# Patient Record
Sex: FEMALE | Race: WHITE | Employment: OTHER | URBAN - METROPOLITAN AREA
[De-identification: names, ages, dates, MRNs, and addresses within clinical notes are randomized per-mention and may not be internally consistent; named-entity substitution may affect disease eponyms.]

---

## 2023-11-30 ENCOUNTER — OFFICE VISIT (OUTPATIENT)
Facility: LOCATION | Age: 83
End: 2023-11-30
Payer: MEDICARE

## 2023-11-30 DIAGNOSIS — H90.5 SENSORINEURAL HEARING LOSS (SNHL) OF RIGHT EAR, UNSPECIFIED HEARING STATUS ON CONTRALATERAL SIDE: Primary | ICD-10-CM

## 2023-11-30 DIAGNOSIS — H90.3 SENSORINEURAL HEARING LOSS, ASYMMETRICAL: Primary | ICD-10-CM

## 2023-11-30 DIAGNOSIS — H91.93 AUDITORY IMPAIRMENT, BILATERAL: ICD-10-CM

## 2023-11-30 DIAGNOSIS — H90.3 SENSORY HEARING LOSS, BILATERAL: ICD-10-CM

## 2023-11-30 PROCEDURE — 99204 OFFICE O/P NEW MOD 45 MIN: CPT | Performed by: OTOLARYNGOLOGY

## 2023-11-30 PROCEDURE — 92557 COMPREHENSIVE HEARING TEST: CPT | Performed by: AUDIOLOGIST

## 2023-11-30 PROCEDURE — 92567 TYMPANOMETRY: CPT | Performed by: AUDIOLOGIST

## 2023-11-30 NOTE — PROGRESS NOTES
Kae Cho was seen for an audiometric evaluation and tympanogram today. Referred back to physician.     Beka July, AuD

## 2023-11-30 NOTE — PROGRESS NOTES
[facility], [facility] [address] Melvin Aburto    Report of Consultation    Date of Consult: 11/30/2023     Reason for Consultation:   Decreased hearing. History of Present Illness:   Patient is a 82year old female who is being seen for a recent change to her hearing of the right ear. Patient has worn a hearing aid on the left side but feels that the right side has decreased over the last several months. She denies fevers chills nausea or vomiting. 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 Past Medical History  Past Medical History:   Diagnosis Date    Cancer of head, face, and neck     s/p resection of part of tongue, tonsil, neck    Chest pain 7/2009    NEG STRESS TEST       Past Surgical History  Past Surgical History:   Procedure Laterality Date    CHOLECYSTECTOMY      CORNEAL TRNSPL, ENDOTHELIAL      left eye    OTHER SURGICAL HISTORY      s/p cancer resection       Family History  Family History   Problem Relation Age of Onset    Heart Disorder Mother         CHF    Diabetes Mother        Social History  Pediatric History   Patient Parents    Not on file     Other Topics Concern    Not on file   Social History Narrative    Not on file           Current Medications:  Current Outpatient Medications   Medication Sig Dispense Refill    Escitalopram Oxalate (LEXAPRO) 10 MG Oral Tab 1 TABLET DAILY 30 Tab 0    FISH OIL 1000 MG OR CAPS daily      ALEVE 220 MG OR CAPS 1 CAPSULE EVERY 12 HOURS AS NEEDED      ADVIL 200 MG OR TABS 1 TABLET EVERY 4 TO 6 HOURS AS NEEDED         Allergies  Allergies   Allergen Reactions    Penicillins NAUSEA ONLY       Review of Systems:   A comprehensive review of systems was negative. Physical Exam:   There were no vitals taken for this visit. Constitutional Normal Overall appearance - Normal.   Psychiatric Normal Orientation - Oriented to time, place, person & situation. Appropriate mood and affect. Head/Face Normal Facial features -- Normal. Skull - Normal.   Eyes Normal Pupils equal ,round ,react to light and accomidate   Ears Normal External Ear Right: Normal, Left: Normal. Canal - Right: Normal, Left: Normal. TM - Right: Normal, Left: Normal.   Nose Normal External Nose, Normal, Septum -Midline, Right, Left Turbinates - Right: Normal, Hypertrophic Left: Normal, Hypertrophic   Mouth/Throat Normal Lips/teeth/gums - Normal. Tonsils - Normal. Oropharynx - Normal.   Neck Exam Normal Inspection - Normal. Palpation - Normal. Parotid gland - Normal. Thyroid gland - Normal.   Neurological Normal Memory - Normal. Cranial nerves - Cranial nerves II through XII grossly intact. Nasopharynx Normal  Normal        Skin Normal Inspection - Normal.        Lymph Detail Normal Submental. Submandibular. Anterior cervical. Posterior cervical. Supraclavicular. Audiogram shows bilateral sensorineural hearing loss with a asymmetry in the right ear across all frequencies. Results:     Laboratory Data:  Lab Results   Component Value Date    WBC 4.9 07/22/2009     07/22/2009    CREATSERUM 0.80 02/15/2010    BUN 16 02/15/2010     07/22/2009    K 3.8 07/22/2009     07/22/2009    CO2 25 07/22/2009    ALB 4.3 07/22/2009    ALKPHO 78 07/22/2009    TP 6.9 07/22/2009    AST 18 07/22/2009    ALT 21 07/22/2009    TSH 3.720 02/15/2010         Imaging:  No results found. Impression:   Patient has asymmetric hearing in the right ear with also a sensorineural hearing loss which would be amenable to amplification. Recommendations:  I have cleared her for hearing aid use for each ear. She will see audiology regarding hearing aids. Due to the asymmetry I recommend she have MRI of IACs to better delineate the anatomy. She had a normal MRI approximately a year ago just in the brain. Patient and her friend understand her treatment plan.       Thank you for allowing me to participate in the care of your patient.       Thomas Polanco MD  11/30/2023

## 2024-02-13 ENCOUNTER — HOSPITAL ENCOUNTER (OUTPATIENT)
Dept: MRI IMAGING | Age: 84
Discharge: HOME | End: 2024-02-13
Attending: OTOLARYNGOLOGY
Payer: MEDICARE

## 2024-02-13 DIAGNOSIS — H90.5 SENSORINEURAL HEARING LOSS (SNHL) OF RIGHT EAR, UNSPECIFIED HEARING STATUS ON CONTRALATERAL SIDE: ICD-10-CM

## 2024-02-13 PROCEDURE — 70551 MRI BRAIN STEM W/O DYE: CPT | Performed by: OTOLARYNGOLOGY

## 2024-10-30 ENCOUNTER — OFFICE VISIT (OUTPATIENT)
Dept: INTERNAL MEDICINE CLINIC | Facility: CLINIC | Age: 84
End: 2024-10-30

## 2024-10-30 VITALS
WEIGHT: 148 LBS | DIASTOLIC BLOOD PRESSURE: 58 MMHG | TEMPERATURE: 98 F | HEART RATE: 57 BPM | OXYGEN SATURATION: 96 % | BODY MASS INDEX: 27.23 KG/M2 | HEIGHT: 62 IN | SYSTOLIC BLOOD PRESSURE: 96 MMHG

## 2024-10-30 DIAGNOSIS — G56.03 CARPAL TUNNEL SYNDROME ON BOTH SIDES: ICD-10-CM

## 2024-10-30 DIAGNOSIS — M25.561 ACUTE PAIN OF RIGHT KNEE: ICD-10-CM

## 2024-10-30 DIAGNOSIS — E03.8 OTHER SPECIFIED HYPOTHYROIDISM: Primary | ICD-10-CM

## 2024-10-30 DIAGNOSIS — K13.79 MOUTH SORE: ICD-10-CM

## 2024-10-30 PROBLEM — G60.9 IDIOPATHIC PERIPHERAL NEUROPATHY: Status: ACTIVE | Noted: 2021-06-21

## 2024-10-30 PROBLEM — H90.3 SENSORINEURAL HEARING LOSS (SNHL) OF BOTH EARS: Status: ACTIVE | Noted: 2024-03-15

## 2024-10-30 PROBLEM — M25.569 CHRONIC KNEE PAIN: Status: ACTIVE | Noted: 2024-04-12

## 2024-10-30 PROBLEM — H81.10 BPPV (BENIGN PAROXYSMAL POSITIONAL VERTIGO), UNSPECIFIED LATERALITY: Status: ACTIVE | Noted: 2022-02-15

## 2024-10-30 PROBLEM — I62.9 INTRACRANIAL HEMORRHAGE (HCC): Status: ACTIVE | Noted: 2022-07-29

## 2024-10-30 PROBLEM — M54.9 CHRONIC NECK AND BACK PAIN: Status: ACTIVE | Noted: 2024-04-12

## 2024-10-30 PROBLEM — R47.9 SPEECH IMPEDIMENT: Status: ACTIVE | Noted: 2024-03-15

## 2024-10-30 PROBLEM — R29.6 RECURRENT FALLS: Status: ACTIVE | Noted: 2022-08-01

## 2024-10-30 PROBLEM — M47.817 LUMBOSACRAL SPONDYLOSIS WITHOUT MYELOPATHY: Status: ACTIVE | Noted: 2024-10-30

## 2024-10-30 PROBLEM — M54.2 CHRONIC NECK AND BACK PAIN: Status: ACTIVE | Noted: 2024-04-12

## 2024-10-30 PROBLEM — M17.0 PRIMARY OSTEOARTHRITIS OF BOTH KNEES: Status: ACTIVE | Noted: 2022-05-18

## 2024-10-30 PROBLEM — M96.1 CERVICAL POST-LAMINECTOMY SYNDROME: Status: ACTIVE | Noted: 2019-11-01

## 2024-10-30 PROBLEM — G89.29 CHRONIC NECK AND BACK PAIN: Status: ACTIVE | Noted: 2024-04-12

## 2024-10-30 PROBLEM — G89.29 CHRONIC KNEE PAIN: Status: ACTIVE | Noted: 2024-04-12

## 2024-10-30 PROBLEM — E78.2 MIXED HYPERLIPIDEMIA: Status: ACTIVE | Noted: 2022-11-21

## 2024-10-30 PROBLEM — G89.29 CHRONIC HAND PAIN: Status: ACTIVE | Noted: 2024-04-12

## 2024-10-30 PROBLEM — R47.1 DYSARTHRIA: Status: ACTIVE | Noted: 2022-08-01

## 2024-10-30 PROBLEM — E55.9 VITAMIN D DEFICIENCY: Status: ACTIVE | Noted: 2019-10-31

## 2024-10-30 PROBLEM — R41.3 MEMORY LOSS DUE TO MEDICAL CONDITION: Status: ACTIVE | Noted: 2022-08-18

## 2024-10-30 PROBLEM — F07.81 POST CONCUSSIVE SYNDROME: Status: ACTIVE | Noted: 2022-09-02

## 2024-10-30 PROBLEM — R26.9 ABNORMALITY OF GAIT: Status: ACTIVE | Noted: 2022-08-01

## 2024-10-30 PROBLEM — M79.643 CHRONIC HAND PAIN: Status: ACTIVE | Noted: 2024-04-12

## 2024-10-30 PROBLEM — E03.9 HYPOTHYROIDISM (ACQUIRED): Status: ACTIVE | Noted: 2022-02-15

## 2024-10-30 PROBLEM — S06.9X9A TRAUMATIC BRAIN INJURY WITH LOSS OF CONSCIOUSNESS (HCC): Status: ACTIVE | Noted: 2022-08-01

## 2024-10-30 PROBLEM — H43.392 VITREOUS FLOATERS OF LEFT EYE: Status: ACTIVE | Noted: 2022-02-15

## 2024-10-30 PROCEDURE — 99204 OFFICE O/P NEW MOD 45 MIN: CPT | Performed by: INTERNAL MEDICINE

## 2024-10-30 RX ORDER — GABAPENTIN 100 MG/1
100 CAPSULE ORAL 3 TIMES DAILY
COMMUNITY
Start: 2024-02-23

## 2024-10-30 RX ORDER — SENNOSIDES 8.6 MG
650 CAPSULE ORAL
COMMUNITY
Start: 2022-05-18

## 2024-10-30 RX ORDER — CALCIUM CARBONATE 260MG(650)
500 TABLET,CHEWABLE ORAL
COMMUNITY

## 2024-10-30 NOTE — PROGRESS NOTES
Ent  Subjective:     Patient ID: Melissa Briceno is a 84 year old female.    Numbness  Associated symptoms include arthralgias and numbness.       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  She used to follow-up with ENT she does not follow anymore.  She also has history of hearing loss close speech impairment hyperlipidemia chronic bilateral hand pain chronic bilateral knee pain back pain and urinary incontinence    According to her for last 1 month she does have some lesion on her left corner of her mouth which does not go away    She is also complaining of knee pain which progressively is getting worse in the past she was seen by Ortho    She does have bilateral hand pain due to carpal tunnel she was started on gabapentin but is not helping she wants to establish care with new physiatrist t      Review of Systems   Constitutional: Negative.    HENT: Negative.     Eyes: Negative.    Respiratory: Negative.     Cardiovascular: Negative.    Gastrointestinal: Negative.    Endocrine: Negative.    Genitourinary: Negative.    Musculoskeletal:  Positive for arthralgias and back pain.   Neurological:  Positive for numbness.   Psychiatric/Behavioral: Negative.       Current Outpatient Medications   Medication Sig Dispense Refill    gabapentin 100 MG Oral Cap Take 1 capsule (100 mg total) by mouth 3 (three) times daily.      Acetaminophen  MG Oral Tab CR Take 1 tablet (650 mg total) by mouth.      Calcium Carbonate 260 MG Oral Chew Tab Chew 500 mg by mouth.       Allergies:Allergies[1]    Past Medical History:    Cancer of head, face, and neck    s/p resection of part of tongue, tonsil, neck    Chest pain    NEG STRESS TEST    Spinal stenosis      Past Surgical History:   Procedure  Laterality Date    Cholecystectomy      Corneal trnspl, endothelial      left eye    Other surgical history      s/p cancer resection      Family History   Problem Relation Age of Onset    Heart Disorder Mother         CHF    Diabetes Mother       Social History:   Social History     Socioeconomic History    Marital status:    Tobacco Use    Smoking status: Former   Vaping Use    Vaping status: Never Used   Substance and Sexual Activity    Alcohol use: Yes     Comment: occ wine w/ dinner    Drug use: Not Currently    Sexual activity: Not Currently     Social Drivers of Health      Received from [facility]    Housing Stability        Objective:   Physical Exam  Vitals and nursing note reviewed.   Constitutional:       Appearance: Normal appearance.   HENT:      Head: Normocephalic and atraumatic.      Mouth/Throat:      Tongue: No lesions. Tongue does not deviate from midline.     Cardiovascular:      Rate and Rhythm: Normal rate and regular rhythm.      Pulses: Normal pulses.      Heart sounds: Normal heart sounds.   Pulmonary:      Effort: Pulmonary effort is normal.      Breath sounds: Normal breath sounds.   Abdominal:      Palpations: Abdomen is soft.   Musculoskeletal:      Cervical back: Normal range of motion and neck supple.      Right knee: No swelling, deformity, effusion, erythema, ecchymosis or lacerations. Normal range of motion. Tenderness present.   Skin:     General: Skin is warm.   Neurological:      Mental Status: She is alert. Mental status is at baseline.         Assessment & Plan:   1. Other specified hypothyroidism will check TSH   2. Acute pain of right knee I did advise her to use Voltaren cream twice a day I will refer to see Ortho   3. Carpal tunnel syndrome on both sides, chronic, follow-up with physiatry   4. Mouth sore/h/o  cancer  I will refer her  to see ENT       Orders Placed This Encounter   Procedures    TSH W Reflex To Free T4 [E]       Meds This  Visit:  Requested Prescriptions      No prescriptions requested or ordered in this encounter       Imaging & Referrals:  ORTHOPEDIC - INTERNAL  PHYSIATRY - INTERNAL  ENT - INTERNAL            [1]   Allergies  Allergen Reactions    Penicillins NAUSEA ONLY

## 2025-05-27 NOTE — PROGRESS NOTES
Subjective:     Patient ID: Melissa Briceno is a 85 year old female.    Dizziness  Associated symptoms include numbness.   Diarrhea     Numbness  Associated symptoms include numbness.       History/Other:   Today to discuss multiple complaints.  According to the patient few weeks ago ,she was feeling dizzy like room spinning sensation.  That lasted only 1 day and resolved.  She states that she had similar episode in the past at that time was told that it is vertigo and she took meclizine.  Currently she denies any dizziness.    She continues to have pain on both of her hands and tingling sensation.  She did not see a physiatrist yet    She continues to have pain on her knees she did not follow-up with Ortho yet    She does have urinary frequency and on and off incontinence.  She did not see urogynecology  Has chronic diarrhea and according to her friend she takes Imodium every day is ongoing for long time.  She is depressed she does not want to get out of her room    Review of Systems   Constitutional: Negative.    HENT: Negative.     Eyes: Negative.    Respiratory: Negative.     Cardiovascular: Negative.    Gastrointestinal:  Positive for diarrhea.   Endocrine: Negative.    Genitourinary: Negative.    Musculoskeletal: Negative.    Neurological:  Positive for dizziness and numbness.   Hematological: Negative.    Psychiatric/Behavioral: Negative.       Current Medications[1]  Allergies:Allergies[2]    Past Medical History[3]   Past Surgical History[4]   Family History[5]   Social History: Short Social Hx on File[6]     Objective:   Physical Exam  Vitals and nursing note reviewed.   Constitutional:       Appearance: Normal appearance.   HENT:      Head: Normocephalic and atraumatic.   Cardiovascular:      Rate and Rhythm: Normal rate and regular rhythm.      Pulses: Normal pulses.      Heart sounds: Normal heart sounds.   Pulmonary:      Effort: Pulmonary effort is normal.      Breath sounds: Normal breath sounds.    Abdominal:      Palpations: Abdomen is soft.   Musculoskeletal:         General: Normal range of motion.      Cervical back: Normal range of motion and neck supple.   Skin:     General: Skin is warm.   Neurological:      Mental Status: She is alert. Mental status is at baseline.   Psychiatric:         Mood and Affect: Mood normal.         Assessment & Plan:   No diagnosis found.  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      Meds This Visit:  Requested Prescriptions      No prescriptions requested or ordered in this encounter       Imaging & Referrals:  None            [1]   Current Outpatient Medications   Medication Sig Dispense Refill    gabapentin 100 MG Oral Cap Take 1 capsule (100 mg total) by mouth 3 (three) times daily. 270 capsule 0    Acetaminophen  MG Oral Tab CR Take 1 tablet (650 mg total) by mouth.      ketoconazole 2 % External Shampoo Apply 1 Application topically twice a week. (Patient not taking: Reported on 5/27/2025) 1 each 0    Calcium Carbonate 260 MG Oral Chew Tab Chew 500 mg by mouth. (Patient not taking: Reported on 5/27/2025)     [2]   Allergies  Allergen Reactions    Benzalkonium Chloride UNKNOWN    Penicillins NAUSEA ONLY   [3]   Past Medical History:   Cancer of head, face, and neck    s/p resection of part of tongue, tonsil, neck    Chest pain    NEG STRESS TEST    Spinal stenosis   [4]   Past Surgical History:  Procedure Laterality Date    Cholecystectomy      Corneal  trnspl, endothelial      left eye    Other surgical history      s/p cancer resection   [5]   Family History  Problem Relation Age of Onset    Heart Disorder Mother         CHF    Diabetes Mother    [6]   Social History  Socioeconomic History    Marital status:    Tobacco Use    Smoking status: Former   Vaping Use    Vaping status: Never Used   Substance and Sexual Activity    Alcohol use: Yes     Comment: occ wine w/ dinner    Drug use: Not Currently    Sexual activity: Not Currently     Social Drivers of Health      Received from [facility]    Housing Stability